# Patient Record
Sex: MALE | Race: BLACK OR AFRICAN AMERICAN | NOT HISPANIC OR LATINO | Employment: OTHER | URBAN - METROPOLITAN AREA
[De-identification: names, ages, dates, MRNs, and addresses within clinical notes are randomized per-mention and may not be internally consistent; named-entity substitution may affect disease eponyms.]

---

## 2017-03-06 PROBLEM — Z86.14 HISTORY OF MRSA INFECTION: Status: ACTIVE | Noted: 2017-03-06

## 2017-03-06 PROBLEM — M25.362 PATELLOFEMORAL INSTABILITY OF LEFT KNEE WITH PAIN: Status: ACTIVE | Noted: 2017-03-06

## 2017-03-06 PROBLEM — M25.562 PATELLOFEMORAL INSTABILITY OF LEFT KNEE WITH PAIN: Status: ACTIVE | Noted: 2017-03-06

## 2017-05-23 DIAGNOSIS — E11.9 DIABETES MELLITUS WITHOUT COMPLICATION: ICD-10-CM

## 2017-09-11 PROBLEM — L84 FOOT CALLUS: Status: ACTIVE | Noted: 2017-09-11

## 2018-02-07 ENCOUNTER — TELEPHONE (OUTPATIENT)
Dept: ADMINISTRATIVE | Facility: HOSPITAL | Age: 78
End: 2018-02-07

## 2018-02-28 PROBLEM — I25.5 ISCHEMIC CARDIOMYOPATHY: Status: ACTIVE | Noted: 2018-02-28

## 2018-02-28 PROBLEM — I20.9 ANGINA, CLASS II: Status: ACTIVE | Noted: 2018-02-28

## 2018-05-24 PROBLEM — Z95.810 ICD (IMPLANTABLE CARDIOVERTER-DEFIBRILLATOR) IN PLACE: Status: ACTIVE | Noted: 2018-05-24

## 2018-05-24 PROBLEM — R41.3 MEMORY LOSS: Status: ACTIVE | Noted: 2018-05-24

## 2018-05-24 PROBLEM — I50.42 CHRONIC COMBINED SYSTOLIC AND DIASTOLIC CONGESTIVE HEART FAILURE: Status: ACTIVE | Noted: 2018-05-24

## 2018-07-14 PROBLEM — E11.649 HYPOGLYCEMIA ASSOCIATED WITH TYPE 2 DIABETES MELLITUS: Status: ACTIVE | Noted: 2018-07-14

## 2018-07-14 PROBLEM — R56.9 WITNESSED SEIZURE-LIKE ACTIVITY: Status: ACTIVE | Noted: 2018-07-14

## 2018-07-16 PROBLEM — E63.9 INADEQUATE DIETARY ENERGY INTAKE: Status: ACTIVE | Noted: 2018-07-16

## 2018-08-06 ENCOUNTER — TELEPHONE (OUTPATIENT)
Dept: ADMINISTRATIVE | Facility: CLINIC | Age: 78
End: 2018-08-06

## 2018-08-06 NOTE — TELEPHONE ENCOUNTER
Home Health SOC 07/20/2018 to 09/17/2018 with [facility], Dr. Cheryl Vaz.  service

## 2018-08-22 PROBLEM — G40.909 SEIZURE DISORDER: Status: ACTIVE | Noted: 2018-08-22

## 2018-08-24 PROBLEM — R44.3 HALLUCINATIONS: Status: ACTIVE | Noted: 2018-08-24

## 2018-08-24 PROBLEM — N17.9 AKI (ACUTE KIDNEY INJURY): Status: ACTIVE | Noted: 2018-08-24

## 2018-08-27 PROBLEM — N17.9 AKI (ACUTE KIDNEY INJURY): Status: RESOLVED | Noted: 2018-08-24 | Resolved: 2018-08-27

## 2018-09-25 PROBLEM — E78.00 PURE HYPERCHOLESTEROLEMIA: Status: ACTIVE | Noted: 2018-09-25

## 2018-10-30 PROBLEM — E86.0 DEHYDRATION: Status: ACTIVE | Noted: 2018-10-30

## 2018-10-31 PROBLEM — E11.621 DIABETIC FOOT ULCERS: Status: ACTIVE | Noted: 2018-10-31

## 2018-10-31 PROBLEM — J81.0 ACUTE PULMONARY EDEMA: Status: ACTIVE | Noted: 2018-10-31

## 2018-10-31 PROBLEM — L97.509 DIABETIC FOOT ULCERS: Status: ACTIVE | Noted: 2018-10-31

## 2018-10-31 PROBLEM — I21.4 NON-ST ELEVATION MI (NSTEMI): Status: ACTIVE | Noted: 2018-10-31

## 2018-10-31 PROBLEM — G93.41 ENCEPHALOPATHY, METABOLIC: Status: ACTIVE | Noted: 2018-10-31

## 2018-10-31 PROBLEM — J96.00 ACUTE RESPIRATORY FAILURE: Status: ACTIVE | Noted: 2018-10-31

## 2018-10-31 PROBLEM — E11.621: Status: ACTIVE | Noted: 2018-10-31

## 2018-10-31 PROBLEM — L97.401: Status: ACTIVE | Noted: 2018-10-31

## 2018-10-31 PROBLEM — E86.1 HYPOTENSION DUE TO HYPOVOLEMIA: Status: ACTIVE | Noted: 2018-10-31

## 2018-11-01 PROBLEM — Z75.8 DISCHARGE PLANNING ISSUES: Status: ACTIVE | Noted: 2018-11-01

## 2018-11-21 ENCOUNTER — TELEPHONE (OUTPATIENT)
Dept: ADMINISTRATIVE | Facility: HOSPITAL | Age: 78
End: 2018-11-21

## 2019-01-03 ENCOUNTER — TELEPHONE (OUTPATIENT)
Dept: ADMINISTRATIVE | Facility: CLINIC | Age: 79
End: 2019-01-03

## 2019-01-03 NOTE — TELEPHONE ENCOUNTER
Home Health Recert 11/12/2018 - 01/10/2019 with [facility] - Dr. Cheryl Vaz. MSW services.

## 2019-03-11 DIAGNOSIS — I73.9 PAD (PERIPHERAL ARTERY DISEASE): ICD-10-CM

## 2019-03-11 DIAGNOSIS — E11.621 DIABETIC ULCER OF LEFT HEEL ASSOCIATED WITH TYPE 2 DIABETES MELLITUS, WITH FAT LAYER EXPOSED: Primary | ICD-10-CM

## 2019-03-11 DIAGNOSIS — L97.422 DIABETIC ULCER OF LEFT HEEL ASSOCIATED WITH TYPE 2 DIABETES MELLITUS, WITH FAT LAYER EXPOSED: Primary | ICD-10-CM

## 2019-03-11 RX ORDER — LIDOCAINE HYDROCHLORIDE 10 MG/ML
1 INJECTION, SOLUTION EPIDURAL; INFILTRATION; INTRACAUDAL; PERINEURAL ONCE
Status: CANCELLED | OUTPATIENT
Start: 2019-03-11 | End: 2019-03-11

## 2019-03-11 RX ORDER — SODIUM CHLORIDE 0.9 % (FLUSH) 0.9 %
5 SYRINGE (ML) INJECTION
Status: CANCELLED | OUTPATIENT
Start: 2019-03-11

## 2019-03-25 PROBLEM — I73.9 PAD (PERIPHERAL ARTERY DISEASE): Status: ACTIVE | Noted: 2019-03-25

## 2019-03-27 PROBLEM — R63.8 INCREASED NUTRITIONAL NEEDS: Status: ACTIVE | Noted: 2019-03-27

## 2019-04-22 PROBLEM — E11.621: Status: RESOLVED | Noted: 2018-10-31 | Resolved: 2019-04-22

## 2019-04-22 PROBLEM — L97.401: Status: RESOLVED | Noted: 2018-10-31 | Resolved: 2019-04-22

## 2019-06-19 PROBLEM — L08.9 DIABETIC FOOT INFECTION: Status: ACTIVE | Noted: 2019-06-19

## 2019-06-19 PROBLEM — E11.628 DIABETIC FOOT INFECTION: Status: ACTIVE | Noted: 2019-06-19

## 2019-06-20 PROBLEM — L89.610 UNSTAGEABLE PRESSURE ULCER OF RIGHT HEEL: Status: ACTIVE | Noted: 2019-06-20

## 2019-06-21 PROBLEM — L97.509 DIABETIC FOOT ULCERS: Chronic | Status: ACTIVE | Noted: 2018-10-31

## 2019-06-21 PROBLEM — E11.621 DIABETIC FOOT ULCERS: Chronic | Status: ACTIVE | Noted: 2018-10-31

## 2019-06-21 PROBLEM — Z95.810 ICD (IMPLANTABLE CARDIOVERTER-DEFIBRILLATOR) IN PLACE: Chronic | Status: ACTIVE | Noted: 2018-05-24

## 2019-06-21 PROBLEM — R63.8 ALTERATION IN NUTRITION: Status: ACTIVE | Noted: 2019-06-21

## 2019-06-21 PROBLEM — G40.909 SEIZURE DISORDER: Chronic | Status: ACTIVE | Noted: 2018-08-22

## 2019-06-21 PROBLEM — I73.9 PAD (PERIPHERAL ARTERY DISEASE): Chronic | Status: ACTIVE | Noted: 2019-03-25

## 2019-06-21 PROBLEM — I21.4 NON-ST ELEVATION MI (NSTEMI): Chronic | Status: ACTIVE | Noted: 2018-10-31

## 2019-06-21 PROBLEM — I50.42 CHRONIC COMBINED SYSTOLIC AND DIASTOLIC CONGESTIVE HEART FAILURE: Chronic | Status: ACTIVE | Noted: 2018-05-24

## 2019-07-01 PROBLEM — B37.0 THRUSH, ORAL: Status: ACTIVE | Noted: 2019-07-01

## 2019-07-26 PROBLEM — Z89.619 HX OF AKA (ABOVE KNEE AMPUTATION): Status: ACTIVE | Noted: 2019-07-26

## 2019-08-09 PROBLEM — T81.30XA WOUND DEHISCENCE: Status: ACTIVE | Noted: 2019-08-09
